# Patient Record
Sex: MALE | Race: BLACK OR AFRICAN AMERICAN | NOT HISPANIC OR LATINO | URBAN - METROPOLITAN AREA
[De-identification: names, ages, dates, MRNs, and addresses within clinical notes are randomized per-mention and may not be internally consistent; named-entity substitution may affect disease eponyms.]

---

## 2018-09-25 ENCOUNTER — EMERGENCY (EMERGENCY)
Facility: HOSPITAL | Age: 38
LOS: 0 days | Discharge: LEFT BEFORE DISCHARGE | End: 2018-09-26
Attending: EMERGENCY MEDICINE
Payer: SELF-PAY

## 2018-09-25 VITALS
HEART RATE: 89 BPM | TEMPERATURE: 98 F | OXYGEN SATURATION: 98 % | DIASTOLIC BLOOD PRESSURE: 63 MMHG | RESPIRATION RATE: 18 BRPM | SYSTOLIC BLOOD PRESSURE: 133 MMHG

## 2018-09-25 VITALS
WEIGHT: 205.03 LBS | HEART RATE: 93 BPM | SYSTOLIC BLOOD PRESSURE: 130 MMHG | RESPIRATION RATE: 18 BRPM | TEMPERATURE: 98 F | OXYGEN SATURATION: 96 % | DIASTOLIC BLOOD PRESSURE: 77 MMHG

## 2018-09-25 DIAGNOSIS — K92.0 HEMATEMESIS: ICD-10-CM

## 2018-09-25 DIAGNOSIS — R07.9 CHEST PAIN, UNSPECIFIED: ICD-10-CM

## 2018-09-25 DIAGNOSIS — M62.82 RHABDOMYOLYSIS: ICD-10-CM

## 2018-09-25 LAB
ALBUMIN SERPL ELPH-MCNC: 3.6 G/DL — SIGNIFICANT CHANGE UP (ref 3.3–5)
ALP SERPL-CCNC: 118 U/L — SIGNIFICANT CHANGE UP (ref 40–120)
ALT FLD-CCNC: 34 U/L — SIGNIFICANT CHANGE UP (ref 12–78)
AMYLASE P1 CFR SERPL: 50 U/L — SIGNIFICANT CHANGE UP (ref 25–115)
ANION GAP SERPL CALC-SCNC: 5 MMOL/L — SIGNIFICANT CHANGE UP (ref 5–17)
APAP SERPL-MCNC: < 2 UG/ML (ref 10–30)
APTT BLD: 31.2 SEC — SIGNIFICANT CHANGE UP (ref 27.5–37.4)
AST SERPL-CCNC: 51 U/L — HIGH (ref 15–37)
BASOPHILS # BLD AUTO: 0 K/UL — SIGNIFICANT CHANGE UP (ref 0–0.2)
BASOPHILS NFR BLD AUTO: 0 % — SIGNIFICANT CHANGE UP (ref 0–2)
BILIRUB SERPL-MCNC: 0.3 MG/DL — SIGNIFICANT CHANGE UP (ref 0.2–1.2)
BLD GP AB SCN SERPL QL: SIGNIFICANT CHANGE UP
BUN SERPL-MCNC: 10 MG/DL — SIGNIFICANT CHANGE UP (ref 7–23)
CALCIUM SERPL-MCNC: 8.7 MG/DL — SIGNIFICANT CHANGE UP (ref 8.5–10.1)
CHLORIDE SERPL-SCNC: 104 MMOL/L — SIGNIFICANT CHANGE UP (ref 96–108)
CK MB BLD-MCNC: 0.2 % — SIGNIFICANT CHANGE UP (ref 0–3.5)
CK MB CFR SERPL CALC: 2.5 NG/ML — SIGNIFICANT CHANGE UP (ref 0.5–3.6)
CK SERPL-CCNC: 1121 U/L — HIGH (ref 26–308)
CO2 SERPL-SCNC: 30 MMOL/L — SIGNIFICANT CHANGE UP (ref 22–31)
CREAT SERPL-MCNC: 1.07 MG/DL — SIGNIFICANT CHANGE UP (ref 0.5–1.3)
EOSINOPHIL # BLD AUTO: 0.09 K/UL — SIGNIFICANT CHANGE UP (ref 0–0.5)
EOSINOPHIL NFR BLD AUTO: 2.6 % — SIGNIFICANT CHANGE UP (ref 0–6)
ETHANOL SERPL-MCNC: <10 MG/DL — SIGNIFICANT CHANGE UP (ref 0–10)
GLUCOSE SERPL-MCNC: 93 MG/DL — SIGNIFICANT CHANGE UP (ref 70–99)
HCT VFR BLD CALC: 40.9 % — SIGNIFICANT CHANGE UP (ref 39–50)
HGB BLD-MCNC: 13.3 G/DL — SIGNIFICANT CHANGE UP (ref 13–17)
IMM GRANULOCYTES NFR BLD AUTO: 0.3 % — SIGNIFICANT CHANGE UP (ref 0–1.5)
INR BLD: 0.94 RATIO — SIGNIFICANT CHANGE UP (ref 0.88–1.16)
LIDOCAIN IGE QN: 135 U/L — SIGNIFICANT CHANGE UP (ref 73–393)
LYMPHOCYTES # BLD AUTO: 1.06 K/UL — SIGNIFICANT CHANGE UP (ref 1–3.3)
LYMPHOCYTES # BLD AUTO: 30.2 % — SIGNIFICANT CHANGE UP (ref 13–44)
MAGNESIUM SERPL-MCNC: 2.5 MG/DL — SIGNIFICANT CHANGE UP (ref 1.6–2.6)
MCHC RBC-ENTMCNC: 27.5 PG — SIGNIFICANT CHANGE UP (ref 27–34)
MCHC RBC-ENTMCNC: 32.5 GM/DL — SIGNIFICANT CHANGE UP (ref 32–36)
MCV RBC AUTO: 84.5 FL — SIGNIFICANT CHANGE UP (ref 80–100)
MONOCYTES # BLD AUTO: 0.58 K/UL — SIGNIFICANT CHANGE UP (ref 0–0.9)
MONOCYTES NFR BLD AUTO: 16.5 % — HIGH (ref 2–14)
NEUTROPHILS # BLD AUTO: 1.77 K/UL — LOW (ref 1.8–7.4)
NEUTROPHILS NFR BLD AUTO: 50.4 % — SIGNIFICANT CHANGE UP (ref 43–77)
NRBC # BLD: 0 /100 WBCS — SIGNIFICANT CHANGE UP (ref 0–0)
PLATELET # BLD AUTO: 195 K/UL — SIGNIFICANT CHANGE UP (ref 150–400)
POTASSIUM SERPL-MCNC: 3.8 MMOL/L — SIGNIFICANT CHANGE UP (ref 3.5–5.3)
POTASSIUM SERPL-SCNC: 3.8 MMOL/L — SIGNIFICANT CHANGE UP (ref 3.5–5.3)
PROT SERPL-MCNC: 7.4 GM/DL — SIGNIFICANT CHANGE UP (ref 6–8.3)
PROTHROM AB SERPL-ACNC: 10.2 SEC — SIGNIFICANT CHANGE UP (ref 9.8–12.7)
RBC # BLD: 4.84 M/UL — SIGNIFICANT CHANGE UP (ref 4.2–5.8)
RBC # BLD: 4.84 M/UL — SIGNIFICANT CHANGE UP (ref 4.2–5.8)
RBC # FLD: 13.6 % — SIGNIFICANT CHANGE UP (ref 10.3–14.5)
RETICS #: 61.5 K/UL — SIGNIFICANT CHANGE UP (ref 25–125)
RETICS/RBC NFR: 1.3 % — SIGNIFICANT CHANGE UP (ref 0.5–2.5)
SALICYLATES SERPL-MCNC: 2.1 MG/DL — LOW (ref 2.8–20)
SODIUM SERPL-SCNC: 139 MMOL/L — SIGNIFICANT CHANGE UP (ref 135–145)
TROPONIN I SERPL-MCNC: <.015 NG/ML — SIGNIFICANT CHANGE UP (ref 0.01–0.04)
WBC # BLD: 3.51 K/UL — LOW (ref 3.8–10.5)
WBC # FLD AUTO: 3.51 K/UL — LOW (ref 3.8–10.5)

## 2018-09-25 PROCEDURE — 99284 EMERGENCY DEPT VISIT MOD MDM: CPT | Mod: 25

## 2018-09-25 PROCEDURE — 71045 X-RAY EXAM CHEST 1 VIEW: CPT | Mod: 26

## 2018-09-25 RX ORDER — SODIUM CHLORIDE 9 MG/ML
1000 INJECTION INTRAMUSCULAR; INTRAVENOUS; SUBCUTANEOUS ONCE
Qty: 0 | Refills: 0 | Status: COMPLETED | OUTPATIENT
Start: 2018-09-25 | End: 2018-09-25

## 2018-09-25 RX ADMIN — SODIUM CHLORIDE 1000 MILLILITER(S): 9 INJECTION INTRAMUSCULAR; INTRAVENOUS; SUBCUTANEOUS at 21:53

## 2018-09-25 RX ADMIN — SODIUM CHLORIDE 1000 MILLILITER(S): 9 INJECTION INTRAMUSCULAR; INTRAVENOUS; SUBCUTANEOUS at 23:18

## 2018-09-25 NOTE — ED ADULT NURSE REASSESSMENT NOTE - NS ED NURSE REASSESS COMMENT FT1
pt continues to be sleeping, unable to follow directions, cardiac monitor in place NSR. will continue to monitor.

## 2018-09-25 NOTE — ED ADULT NURSE REASSESSMENT NOTE - NS ED NURSE REASSESS COMMENT FT1
PT is uncooperative when attempt made to draw blood pt denies.  pt is sleeping in bed no distress noted. cardiac monitor in place NSR

## 2018-09-25 NOTE — ED ADULT NURSE NOTE - OBJECTIVE STATEMENT
PT c/o of chest pain  and is throwing blood x 6 hours. Pt also c/o of headache. PT has hx of MI and sickle cell anemia

## 2018-09-25 NOTE — ED PROVIDER NOTE - OBJECTIVE STATEMENT
Pertinent PMH/PSH/FHx/SHx and Review of Systems contained within:  Patient presents to the ED for chest pain. Reported to triage that he was vomiting blood but no witnessed episodes in the ER.  Patient approached for evaluation, is asleep, multiple attempts were made to rouse him and unsuccessful.  He appears very drowsy with pinpoint pupils and adequate respirations and continuously drifts back to sleep, not answering questions.  Suspect intox, vitals and airway stable.    Relevant PMHx/SHx/SOCHx/FAMH:  Sickle Cell history per EMS  Tox history unknown Pertinent PMH/PSH/FHx/SHx and Review of Systems contained within:  Patient presents to the ED for chest pain. Reported to triage that he was vomiting blood but no witnessed episodes in the ER.  Patient approached for evaluation, is asleep, multiple attempts were made to rouse him and unsuccessful.  He appears very drowsy with pinpoint pupils and adequate respirations and continuously drifts back to sleep, not answering questions.  Suspect intox, vitals and airway stable.  The staff and I recall this patient from a prior visit when he presented for similar complaint, demanded 8 mg dilaudid and eloped from the ER when a lower and safer dose was given.     Relevant PMHx/SHx/SOCHx/FAMH:  Sickle Cell history per EMS  Tox history unknown

## 2018-09-25 NOTE — ED ADULT NURSE NOTE - NSIMPLEMENTINTERV_GEN_ALL_ED
Implemented All Universal Safety Interventions:  [place] to call system. Call bell, personal items and telephone within reach. Instruct patient to call for assistance. Room bathroom lighting operational. Non-slip footwear when patient is off stretcher. Physically safe environment: no spills, clutter or unnecessary equipment. Stretcher in lowest position, wheels locked, appropriate side rails in place.

## 2018-09-25 NOTE — ED PROVIDER NOTE - CARE PLAN
Principal Discharge DX:	Hematemesis without nausea  Secondary Diagnosis:	Non-traumatic rhabdomyolysis

## 2018-09-25 NOTE — ED PROVIDER NOTE - MEDICAL DECISION MAKING DETAILS
Patient presented to the ER for vomiting blood. VSS.  No episodes of vomiting in the ER.  Labs reviewed, has elevated CK for which he received fluids.  Patient wakes up and refuses care, refused CT imaging, did not wish to discuss admission.  Patient is very lucid and clear when he wakes up, clearly stating his refusal.  Will not sign ama.  Do not feel it is appropriate to continue to treat this patient against his will. Patient is being discharged and advised to return when he is ready to receive medical care. Patient advised to remain hydrated. Patient presented to the ER for vomiting blood. VSS.  No episodes of vomiting in the ER.  Labs reviewed, has elevated CK for which he received fluids.  Patient wakes up and refuses care, refused CT imaging, did not wish to discuss admission.  He expressed anger that an IV was placed for hydration.  Patient is very lucid and clear when he wakes up, clearly stating his refusal.  Will not sign ama.  Do not feel it is appropriate to continue to treat this patient against his will. Patient is being discharged and advised to return when he is ready to receive medical care. Patient advised to remain hydrated. Patient presented to the ER for vomiting blood. VSS.  No episodes of vomiting in the ER.  Labs reviewed, has elevated CK for which he received fluids.  Patient wakes up and refuses care, refused CT imaging.  He expressed anger that an IV was placed for hydration.  Patient is very lucid and clear when he wakes up, clearly stating his refusal.  Will not sign ama.  Says that he is willing to be admitted and proceed with care only if he gets dilaudid 8 mg. Do not feel it is appropriate to continue to treat this patient against his will. Patient is being discharged and advised to return when he is ready to receive medical care. Patient advised to remain hydrated.

## 2018-09-25 NOTE — ED PROVIDER NOTE - PROGRESS NOTE DETAILS
Delay in obtaining labs due to difficult access, also patient woke up and started refusing, went back to sleep, US guided line and labs obtained in left upper arm. Patient is refusing CT imaging. Patient now willing to stay, changed his mind, but is demanding dilaudid before we proceed with his care. Patient now demanding dilaudid 4 mg. Patient is making his own care exceedingly difficult.  Dilaudid given, stated that no escalating doses will be given for safety.  He is not cooperating with reinsertion of IV site.  Explained that he would be admitted for stated hematemesis and rhabdo, but cannot proceed with care.  Patient is alert and oriented in ER with stable gait. Patient refused further care until more dilaudid was given. Walking around the ER without any signs of pain or distress, eating and drinking.  Patient left the ER, given GI follow up, he begged me for percocet.  Will give oxycodone and naloxone, concerned that he will take multiple doses and cause tylenol toxicity.  Patient eclined drug rehab information.  Patient asked for higher doses of narcotic than provided.  Explained to him that after what was seen today, no further narcotics would be given.  He is advised to return to ER for acute worsening. Patient was discharged as below, now willing to stay, changed his mind, but is demanding dilaudid before we proceed with his care.  Dilaudid 2 mg ordered. Patient is making his own care exceedingly difficult.  Dilaudid given, stated that no escalating doses will be given for safety.  He is not cooperating with reinsertion of IV site.  Explained that he would be admitted for stated hematemesis and rhabdo, but cannot proceed with care unless he allows us.  Patient is alert and oriented in ER with stable gait. Patient refused further care until more dilaudid was given. Walking around the ER without any signs of pain or distress, eating and drinking.  Patient finally left the ER when it was clear that no further doses of narcotics would be given.  GI follow up provided.  Prior to eloping from ER he begged me for percocet script, stating that he has no pain medications at home.  Will give 1 day supply of oxycodone instead along with naloxone, concerned that he will take multiple doses and cause tylenol toxicity. Patient declined drug rehab information.  Patient asked for higher doses of narcotic than provided.  Explained to him that after what was seen today, no further narcotics would be given.  He is clearly drug seeking at this point, and suspicion for hematemesis is low.  He is advised to return to ER for acute worsening.

## 2018-09-25 NOTE — ED PROVIDER NOTE - PHYSICAL EXAMINATION
Gen: Asleep, NAD, well appearing  Head: NC, AT, pinpoint, normal lids/conjunctiva  ENT: patent oropharynx without erythema/exudate, uvula midline  Neck: +supple, no tenderness/meningismus/JVD, +Trachea midline  Pulm: Bilateral BS, normal resp effort, no wheeze/stridor/retractions  CV: RRR, no M/R/G, +dist pulses  Abd: soft, NT/ND, +BS, no palpable masses  Mskel: no edema/erythema/cyanosis  Skin: no rash, warm/dry  Neuro: Drowsy, moving all extremities, not posturing

## 2018-09-26 LAB — LDH SERPL L TO P-CCNC: 233 U/L — SIGNIFICANT CHANGE UP (ref 50–242)

## 2018-09-26 RX ORDER — HYDROMORPHONE HYDROCHLORIDE 2 MG/ML
2 INJECTION INTRAMUSCULAR; INTRAVENOUS; SUBCUTANEOUS ONCE
Qty: 0 | Refills: 0 | Status: DISCONTINUED | OUTPATIENT
Start: 2018-09-26 | End: 2018-09-26

## 2018-09-26 RX ORDER — HYDROMORPHONE HYDROCHLORIDE 2 MG/ML
1 INJECTION INTRAMUSCULAR; INTRAVENOUS; SUBCUTANEOUS ONCE
Qty: 0 | Refills: 0 | Status: DISCONTINUED | OUTPATIENT
Start: 2018-09-26 | End: 2018-09-26

## 2018-09-26 RX ORDER — OXYCODONE HYDROCHLORIDE 5 MG/1
1 TABLET ORAL
Qty: 4 | Refills: 0 | OUTPATIENT
Start: 2018-09-26 | End: 2018-09-26

## 2018-09-26 RX ORDER — OMEPRAZOLE 10 MG/1
1 CAPSULE, DELAYED RELEASE ORAL
Qty: 30 | Refills: 0 | OUTPATIENT
Start: 2018-09-26 | End: 2018-10-25

## 2018-09-26 RX ORDER — NALOXONE HYDROCHLORIDE 4 MG/.1ML
4 SPRAY NASAL
Qty: 4 | Refills: 0 | OUTPATIENT
Start: 2018-09-26

## 2018-09-26 RX ORDER — DIPHENHYDRAMINE HCL 50 MG
50 CAPSULE ORAL ONCE
Qty: 0 | Refills: 0 | Status: COMPLETED | OUTPATIENT
Start: 2018-09-26 | End: 2018-09-26

## 2018-09-26 RX ADMIN — Medication 50 MILLIGRAM(S): at 02:03

## 2018-09-26 RX ADMIN — HYDROMORPHONE HYDROCHLORIDE 2 MILLIGRAM(S): 2 INJECTION INTRAMUSCULAR; INTRAVENOUS; SUBCUTANEOUS at 01:40

## 2018-09-26 NOTE — ED ADULT NURSE REASSESSMENT NOTE - NS ED NURSE REASSESS COMMENT FT1
DIAMOND Arguelles made aware. PT eloped Dr Mirella Arguelles made aware. PT A & O x 4, no distress noted, walking around was observed eating and drinking prior

## 2018-09-26 NOTE — ED ADULT NURSE REASSESSMENT NOTE - NS ED NURSE REASSESS COMMENT FT1
PT is uncooperative will not allow repeat blood draw due to elevated CPK . pt refuse treatment , heplock removed per Dr. denise request .